# Patient Record
Sex: MALE | URBAN - METROPOLITAN AREA
[De-identification: names, ages, dates, MRNs, and addresses within clinical notes are randomized per-mention and may not be internally consistent; named-entity substitution may affect disease eponyms.]

---

## 2018-02-19 NOTE — PATIENT DISCUSSION
The patient has mild cornea guttata, or early Fuch's endothelial dystrophy. Specular microscopy documented the cell count and morphology. The possibility that the condition could progress was explained. At this early stage observation and serial specular microscopy are indicated.

## 2018-02-19 NOTE — PATIENT DISCUSSION
Follow. Problem: Goal/Outcome  Goal: Goal Outcome Summary  Outcome: Therapy, progress toward functional goals as expected     Pt seen for dysphagia tx during meal. Pt tolerated DD2 texture foods with nectar-thick liquids with 1x coughing episode, no increase in wet vocal quality noted after coughing spell. Pt independently using strategy of alternating sips of liquid with bites of food, with moderately large bite sizes noted. Trialed small bites of DD3 texture (sugar cookie), pt tolerated with adequate mastication, no excess residue, and no overt s/sx of aspiration. Considering pt's history of variable performance, recommend continuing with DD2/NTL diet, trialing DD3 with SLP only.

## 2018-09-23 NOTE — PATIENT DISCUSSION
Bed: 03  Expected date:   Expected time:   Means of arrival: Self  Comments:   Patient is doing well post-operatively. The importance of post-op drop compliance was emphasized. Drop schedule reviewed with patient. Patient to call if any visual changes or concerns.

## 2019-08-14 ENCOUNTER — IMPORTED ENCOUNTER (OUTPATIENT)
Dept: URBAN - METROPOLITAN AREA CLINIC 9 | Facility: CLINIC | Age: 74
End: 2019-08-14

## 2019-09-24 ENCOUNTER — IMPORTED ENCOUNTER (OUTPATIENT)
Dept: URBAN - METROPOLITAN AREA CLINIC 9 | Facility: CLINIC | Age: 74
End: 2019-09-24

## 2021-10-16 ASSESSMENT — VISUAL ACUITY
OS_CC: 20/200 SN
OS_PH: 20/200 SN
OD_CC: 20/70 - SN
OS_SC: 20/50 -2 SN
OD_SC: 20/70 -2 SN
OS_SC: 20/400 SN
OD_SC: 20/200 SN

## 2021-10-16 ASSESSMENT — TONOMETRY
OD_IOP_MMHG: 16
OS_IOP_MMHG: 12

## 2022-07-03 RX ORDER — QUETIAPINE FUMARATE 25 MG/1
TABLET, FILM COATED ORAL
COMMUNITY

## 2022-07-03 RX ORDER — DONEPEZIL HYDROCHLORIDE 10 MG/1
TABLET, FILM COATED ORAL
COMMUNITY

## 2022-07-03 RX ORDER — ROSUVASTATIN CALCIUM 20 MG/1
TABLET, COATED ORAL
COMMUNITY

## 2022-07-03 RX ORDER — ISOSORBIDE MONONITRATE 30 MG/1
TABLET, EXTENDED RELEASE ORAL
COMMUNITY
End: 2022-09-26 | Stop reason: SDUPTHER

## 2022-07-03 RX ORDER — BUPROPION HYDROCHLORIDE 300 MG/1
TABLET ORAL
COMMUNITY
End: 2022-10-25 | Stop reason: SDUPTHER

## 2022-07-03 RX ORDER — RANOLAZINE 500 MG/1
TABLET, EXTENDED RELEASE ORAL
COMMUNITY

## 2022-07-03 RX ORDER — MEMANTINE HYDROCHLORIDE 28 MG/1
1 CAPSULE, EXTENDED RELEASE ORAL
COMMUNITY

## 2022-07-03 RX ORDER — FAMOTIDINE 20 MG/1
TABLET, FILM COATED ORAL
COMMUNITY

## 2022-07-03 RX ORDER — INSULIN ASPART 100 [IU]/ML
INJECTION, SOLUTION INTRAVENOUS; SUBCUTANEOUS
COMMUNITY
Start: 2022-02-24 | End: 2022-08-31 | Stop reason: SDUPTHER

## 2022-07-03 RX ORDER — FUROSEMIDE 40 MG/1
TABLET ORAL
COMMUNITY

## 2022-07-11 PROBLEM — D63.1 ANEMIA OF CHRONIC RENAL FAILURE: Status: ACTIVE | Noted: 2022-07-11

## 2022-07-11 PROBLEM — N18.9 ANEMIA OF CHRONIC RENAL FAILURE: Status: ACTIVE | Noted: 2022-07-11

## 2022-07-11 PROBLEM — I95.1 ORTHOSTATIC HYPOTENSION: Status: ACTIVE | Noted: 2022-07-11

## 2022-07-11 PROBLEM — N18.4 CHRONIC KIDNEY DISEASE, STAGE IV (SEVERE) (HCC): Status: ACTIVE | Noted: 2022-07-11

## 2022-07-11 PROBLEM — E11.21 DIABETIC RENAL DISEASE (HCC): Status: ACTIVE | Noted: 2022-07-11

## 2022-07-11 PROBLEM — K21.00 GASTRO-ESOPHAGEAL REFLUX DISEASE WITH ESOPHAGITIS: Status: ACTIVE | Noted: 2022-07-11

## 2022-07-11 PROBLEM — D50.9 IRON DEFICIENCY ANEMIA: Status: ACTIVE | Noted: 2022-07-11

## 2022-07-11 PROBLEM — I73.9 CLAUDICATION (HCC): Status: ACTIVE | Noted: 2022-07-11

## 2022-07-11 PROBLEM — E78.2 MIXED HYPERLIPIDEMIA: Status: ACTIVE | Noted: 2022-07-11

## 2022-07-11 PROBLEM — F32.9 MAJOR DEPRESSIVE DISORDER, SINGLE EPISODE, UNSPECIFIED: Status: ACTIVE | Noted: 2022-07-11

## 2022-07-11 PROBLEM — I50.32 CHRONIC HEART FAILURE WITH PRESERVED EJECTION FRACTION (HCC): Status: ACTIVE | Noted: 2022-07-11

## 2022-07-11 PROBLEM — E11.21 TYPE 2 DIABETES MELLITUS WITH DIABETIC NEPHROPATHY (HCC): Status: ACTIVE | Noted: 2022-07-11

## 2022-07-11 PROBLEM — Z78.9 STATIN INTOLERANCE: Status: ACTIVE | Noted: 2022-07-11

## 2022-07-11 PROBLEM — I25.10 ATHEROSCLEROTIC HEART DISEASE OF NATIVE CORONARY ARTERY WITHOUT ANGINA PECTORIS: Status: ACTIVE | Noted: 2022-07-11

## 2022-07-11 PROBLEM — M25.562 LEFT KNEE PAIN: Status: ACTIVE | Noted: 2022-07-11

## 2022-07-11 PROBLEM — F03.90 DEMENTIA (HCC): Status: ACTIVE | Noted: 2022-07-11

## 2022-07-11 PROBLEM — M15.9 POLYOSTEOARTHRITIS, UNSPECIFIED: Status: ACTIVE | Noted: 2022-07-11

## 2022-07-11 PROBLEM — M19.90 OSTEOARTHROSIS: Status: ACTIVE | Noted: 2022-07-11

## 2022-07-11 PROBLEM — I10 HYPERTENSION: Status: ACTIVE | Noted: 2022-07-11

## 2022-07-11 PROBLEM — G20 PARKINSON'S DISEASE (HCC): Status: ACTIVE | Noted: 2022-07-11

## 2025-04-09 NOTE — PATIENT DISCUSSION
Continue current management.
Continue with post-operative drops until completed.
Discussed the importance of blood sugar control in the prevention of ocular complications.
Follow.
Good postoperative appearance.
Monitor yearly for diabetic eye disease.
No active Diabetic Retinopathy is present on examination.
Patient is doing well post-operatively. The importance of post-op drop compliance was emphasized. Drop schedule reviewed with patient. Patient to call if any visual changes or concerns.
Recommended yearly dilated eye examinations.
Retinal exam findings communicated to Physician managing diabetes.
Stable.
The IOP is in the target range.
The OCT is stable.
The patient has mild cornea guttata, or early Fuch's endothelial dystrophy. Specular microscopy documented the cell count and morphology. The possibility that the condition could progress was explained. At this early stage observation and serial specular microscopy are indicated.
Photo Preface (Leave Blank If You Do Not Want): Photographs were obtained today
Detail Level: Zone